# Patient Record
Sex: FEMALE | Race: WHITE | NOT HISPANIC OR LATINO | Employment: FULL TIME | URBAN - METROPOLITAN AREA
[De-identification: names, ages, dates, MRNs, and addresses within clinical notes are randomized per-mention and may not be internally consistent; named-entity substitution may affect disease eponyms.]

---

## 2018-01-02 ENCOUNTER — OFFICE VISIT (OUTPATIENT)
Dept: GASTROENTEROLOGY | Facility: CLINIC | Age: 23
End: 2018-01-02

## 2018-01-02 VITALS
TEMPERATURE: 98.6 F | HEIGHT: 66 IN | WEIGHT: 205.4 LBS | SYSTOLIC BLOOD PRESSURE: 132 MMHG | BODY MASS INDEX: 33.01 KG/M2 | DIASTOLIC BLOOD PRESSURE: 70 MMHG

## 2018-01-02 DIAGNOSIS — K59.00 CONSTIPATION, UNSPECIFIED CONSTIPATION TYPE: Primary | ICD-10-CM

## 2018-01-02 PROCEDURE — 99203 OFFICE O/P NEW LOW 30 MIN: CPT | Performed by: INTERNAL MEDICINE

## 2018-01-02 RX ORDER — THIAMINE HCL 50 MG
50 TABLET ORAL DAILY
COMMUNITY

## 2018-01-02 RX ORDER — ETONOGESTREL/ETHINYL ESTRADIOL .12-.015MG
RING, VAGINAL VAGINAL
Refills: 5 | COMMUNITY
Start: 2017-11-29

## 2018-01-02 NOTE — PROGRESS NOTES
Chief Complaint   Patient presents with   • Abdominal Pain   • Constipation   • Heartburn   • blood on toilet paper     Sunita Dsouza is a 22 y.o. female who presents with constipation that has been a lifelong issue.  She has alternating stools from 3 times daily to every three days.  Has to strain frequently - stools are large and bulky and sometimes painful to pass.  Sees blood on tp when she wipes at times.  Has taken a health drink with Mg in it and when she takes this she has no issues.  Was havign llq pain - negative gyn eval - negative CT in ER.  Has been working with PT on left hip issue and it has improved.  Did not relate to being constipated.  HPI  History reviewed. No pertinent past medical history.  Past Surgical History:   Procedure Laterality Date   • WISDOM TOOTH EXTRACTION         Current Outpatient Prescriptions:   •  MAGNESIUM PO, Take  by mouth., Disp: , Rfl:   •  NUVARING 0.12-0.015 MG/24HR vaginal ring, INSERT 1 RING VAGINALLY FOR 3 WEEKS THEN REMOVE FOR 1 WEEK, Disp: , Rfl: 5  •  Thiamine HCl (VITAMIN B-1) 50 MG tablet, Take 50 mg by mouth Daily., Disp: , Rfl:   No Known Allergies  Social History     Social History   • Marital status:      Spouse name: N/A   • Number of children: N/A   • Years of education: N/A     Occupational History   • Not on file.     Social History Main Topics   • Smoking status: Never Smoker   • Smokeless tobacco: Never Used   • Alcohol use Yes      Comment: social   • Drug use: No   • Sexual activity: Not on file     Other Topics Concern   • Not on file     Social History Narrative   • No narrative on file     History reviewed. No pertinent family history.  Review of Systems   Constitutional: Negative for appetite change and unexpected weight change.   Respiratory: Negative.    Cardiovascular: Negative.    Gastrointestinal: Positive for constipation. Negative for abdominal pain and nausea.   Neurological: Negative.      Vitals:    01/02/18 1258   BP: 132/70    Temp: 98.6 °F (37 °C)     Last Weight    01/02/18  1258   Weight: 93.2 kg (205 lb 6.4 oz)     Physical Exam   Constitutional: She appears well-developed and well-nourished.   HENT:   Head: Normocephalic and atraumatic.   Eyes: No scleral icterus.   Abdominal: Soft. She exhibits no distension and no mass. There is no tenderness.   Neurological: She is alert.   Skin: Skin is warm and dry.   Psychiatric: She has a normal mood and affect.     No images are attached to the encounter.  No notes on file  Sunita was seen today for abdominal pain, constipation, heartburn and blood on toilet paper.    Diagnoses and all orders for this visit:    Constipation, unspecified constipation type  -     TSH    Plan:  - check TSH, recent cbc and CT were normal  - trial miralax

## 2018-01-03 LAB — TSH SERPL DL<=0.005 MIU/L-ACNC: 2.02 MIU/ML (ref 0.27–4.2)

## 2018-01-10 ENCOUNTER — TELEPHONE (OUTPATIENT)
Dept: GASTROENTEROLOGY | Facility: CLINIC | Age: 23
End: 2018-01-10

## 2018-01-10 NOTE — TELEPHONE ENCOUNTER
Call to spouse, Jarod (see HIPAA auth of 1/2/18).  Advise per Dr Dalal that recent labwork was normal.  Jarod verb understanding.

## 2018-01-10 NOTE — TELEPHONE ENCOUNTER
----- Message from Nabila Dalal MD sent at 1/3/2018  9:57 AM EST -----  Your recent labwork was normal.